# Patient Record
Sex: FEMALE | Race: WHITE | Employment: STUDENT | URBAN - METROPOLITAN AREA
[De-identification: names, ages, dates, MRNs, and addresses within clinical notes are randomized per-mention and may not be internally consistent; named-entity substitution may affect disease eponyms.]

---

## 2017-02-24 ENCOUNTER — TELEPHONE (OUTPATIENT)
Dept: FAMILY MEDICINE CLINIC | Facility: CLINIC | Age: 22
End: 2017-02-24

## 2017-02-24 NOTE — TELEPHONE ENCOUNTER
Reviewed pt's immunization hx with mom. If our records are up-to-date then pt due for Meningitis, Tdap, Hep A, Varicella #2, Gardasil. Mom will look to see if she can find some more recent records.  Pt has 5/1/2017 physical scheduled and will get any needed

## 2017-04-14 ENCOUNTER — CHARTING TRANS (OUTPATIENT)
Dept: OTHER | Age: 22
End: 2017-04-14

## 2017-05-01 ENCOUNTER — OFFICE VISIT (OUTPATIENT)
Dept: FAMILY MEDICINE CLINIC | Facility: CLINIC | Age: 22
End: 2017-05-01

## 2017-05-01 VITALS
WEIGHT: 218 LBS | DIASTOLIC BLOOD PRESSURE: 60 MMHG | BODY MASS INDEX: 33.82 KG/M2 | SYSTOLIC BLOOD PRESSURE: 100 MMHG | HEART RATE: 64 BPM | HEIGHT: 67.5 IN | RESPIRATION RATE: 12 BRPM

## 2017-05-01 DIAGNOSIS — J45.20 MILD INTERMITTENT ASTHMA WITHOUT COMPLICATION: ICD-10-CM

## 2017-05-01 DIAGNOSIS — Z71.3 ENCOUNTER FOR DIETARY COUNSELING AND SURVEILLANCE: ICD-10-CM

## 2017-05-01 DIAGNOSIS — Z00.00 ANNUAL PHYSICAL EXAM: Primary | ICD-10-CM

## 2017-05-01 DIAGNOSIS — Z71.82 EXERCISE COUNSELING: ICD-10-CM

## 2017-05-01 DIAGNOSIS — Z23 NEED FOR VACCINATION: ICD-10-CM

## 2017-05-01 PROCEDURE — 90632 HEPA VACCINE ADULT IM: CPT | Performed by: FAMILY MEDICINE

## 2017-05-01 PROCEDURE — 90651 9VHPV VACCINE 2/3 DOSE IM: CPT | Performed by: FAMILY MEDICINE

## 2017-05-01 PROCEDURE — 90472 IMMUNIZATION ADMIN EACH ADD: CPT | Performed by: FAMILY MEDICINE

## 2017-05-01 PROCEDURE — 90734 MENACWYD/MENACWYCRM VACC IM: CPT | Performed by: FAMILY MEDICINE

## 2017-05-01 PROCEDURE — 90471 IMMUNIZATION ADMIN: CPT | Performed by: FAMILY MEDICINE

## 2017-05-01 PROCEDURE — 99395 PREV VISIT EST AGE 18-39: CPT | Performed by: FAMILY MEDICINE

## 2017-05-01 RX ORDER — LEVALBUTEROL TARTRATE 45 UG/1
2 AEROSOL, METERED ORAL EVERY 4 HOURS PRN
Qty: 3 INHALER | Refills: 3 | Status: SHIPPED | OUTPATIENT
Start: 2017-05-01

## 2017-05-01 NOTE — PROGRESS NOTES
Jaylen Brewster is a 22year old female who presents for a complete physical exam.   HPI:   Patient presents with:  Physical  Asthma       Her last annual assessment has been over 1 year: Annual Physical due on 11/09/1998        Symptoms: denies discharge, i of Breath.  Disp: 3 Inhaler Rfl: 3      Past Medical History   Diagnosis Date   • Extrinsic asthma, unspecified      inhalers only when sick   • Migraines           Past Surgical History    TONSILLECTOMY      T&A  7/26/2013    Comment Procedure: Stewart Lau plan.    Assessment:  Problem List Items Addressed This Visit     None         No Follow-up on file. Anne Sosa MD, 5/1/2017, 3:57 PM    Ayla Lawrence is a 22year old female who was brought in for her  Physical and Asthma visit.     History was provid Inhale 2 puffs into the lungs every 4 (four) hours as needed for Wheezing or Shortness of Breath.  Disp: 3 Inhaler Rfl: 3       Allergies    Sulfa Antibiotics           Review of Systems:   Diet:  varied diet and drinks milk and water    Elimination:  no co appropriately for age    Assessment and Plan:     Problem List Items Addressed This Visit        Respiratory    Mild intermittent asthma    Overview     Rare xopenex         Current Assessment & Plan     I reviewed Asthma Control Test Score and reviewed th

## 2017-11-06 ENCOUNTER — HOSPITAL ENCOUNTER (EMERGENCY)
Facility: HOSPITAL | Age: 22
Discharge: HOME | End: 2017-11-06
Attending: EMERGENCY MEDICINE
Payer: COMMERCIAL

## 2017-11-06 VITALS
SYSTOLIC BLOOD PRESSURE: 118 MMHG | OXYGEN SATURATION: 99 % | DIASTOLIC BLOOD PRESSURE: 68 MMHG | HEART RATE: 63 BPM | TEMPERATURE: 98 F | RESPIRATION RATE: 16 BRPM | WEIGHT: 215 LBS | BODY MASS INDEX: 32.58 KG/M2 | HEIGHT: 68 IN

## 2017-11-06 DIAGNOSIS — R11.2 NON-INTRACTABLE VOMITING WITH NAUSEA, UNSPECIFIED VOMITING TYPE: Primary | ICD-10-CM

## 2017-11-06 DIAGNOSIS — R55 SYNCOPE, VASOVAGAL: ICD-10-CM

## 2017-11-06 PROCEDURE — 96361 HYDRATE IV INFUSION ADD-ON: CPT

## 2017-11-06 PROCEDURE — 81003 URINALYSIS AUTO W/O SCOPE: CPT | Performed by: EMERGENCY MEDICINE

## 2017-11-06 PROCEDURE — 80048 BASIC METABOLIC PNL TOTAL CA: CPT | Performed by: EMERGENCY MEDICINE

## 2017-11-06 PROCEDURE — 96374 THER/PROPH/DIAG INJ IV PUSH: CPT

## 2017-11-06 PROCEDURE — 99284 EMERGENCY DEPT VISIT MOD MDM: CPT

## 2017-11-06 PROCEDURE — 85025 COMPLETE CBC W/AUTO DIFF WBC: CPT

## 2017-11-06 PROCEDURE — 80048 BASIC METABOLIC PNL TOTAL CA: CPT

## 2017-11-06 PROCEDURE — 81003 URINALYSIS AUTO W/O SCOPE: CPT

## 2017-11-06 PROCEDURE — 81025 URINE PREGNANCY TEST: CPT

## 2017-11-06 PROCEDURE — 85025 COMPLETE CBC W/AUTO DIFF WBC: CPT | Performed by: EMERGENCY MEDICINE

## 2017-11-06 PROCEDURE — 84443 ASSAY THYROID STIM HORMONE: CPT | Performed by: EMERGENCY MEDICINE

## 2017-11-06 PROCEDURE — 93010 ELECTROCARDIOGRAM REPORT: CPT | Performed by: EMERGENCY MEDICINE

## 2017-11-06 PROCEDURE — 93005 ELECTROCARDIOGRAM TRACING: CPT

## 2017-11-06 PROCEDURE — 80076 HEPATIC FUNCTION PANEL: CPT | Performed by: NURSE PRACTITIONER

## 2017-11-06 PROCEDURE — 81025 URINE PREGNANCY TEST: CPT | Performed by: EMERGENCY MEDICINE

## 2017-11-06 PROCEDURE — 83690 ASSAY OF LIPASE: CPT | Performed by: NURSE PRACTITIONER

## 2017-11-06 RX ORDER — ONDANSETRON 4 MG/1
4 TABLET, ORALLY DISINTEGRATING ORAL EVERY 6 HOURS PRN
Qty: 5 TABLET | Refills: 0 | Status: SHIPPED | OUTPATIENT
Start: 2017-11-06 | End: 2017-11-13

## 2017-11-06 RX ORDER — ONDANSETRON 2 MG/ML
4 INJECTION INTRAMUSCULAR; INTRAVENOUS ONCE
Status: COMPLETED | OUTPATIENT
Start: 2017-11-06 | End: 2017-11-06

## 2017-11-07 NOTE — ED PROVIDER NOTES
Patient Seen in: [facility] AND [facility] Emergency Department    History   Patient presents with:  Nausea/vomiting    Stated Complaint: vomiting    Patient presents into the emergency room for evaluation of nausea and vomiting.   Patient states the onset of the Smokeless tobacco: Never Used                      Alcohol use: No                Review of Systems   Gastrointestinal: Positive for nausea and vomiting. Negative for abdominal pain, constipation and diarrhea.    Neurological: Posit Skin is warm and dry. She is not diaphoretic. Nursing note and vitals reviewed.         ED Course     Labs Reviewed   CBC W/ DIFFERENTIAL - Abnormal; Notable for the following:        Result Value    MCH 32.7 (*)     All other components within normal guallpa Negative   Nitrite Urine Negative Negative   Urobilinogen Urine <2.0 <2.0   Leukocyte Esterase Urine Negative Negative   Ascorbic Acid Urine Negative Negative mg/dL   Microscopic Microscopic not indicated    -PREGNANCY TEST, URINE   Collection Time: 11/06/

## 2017-11-07 NOTE — ED INITIAL ASSESSMENT (HPI)
Having nausea since 4pm, and on and off for 2 weeks. Passed ouit 2 weeks ago and then today. having vomiting no diarrhea.  Having mid abd pain

## 2017-11-14 ENCOUNTER — OFFICE VISIT (OUTPATIENT)
Dept: FAMILY MEDICINE CLINIC | Facility: CLINIC | Age: 22
End: 2017-11-14

## 2017-11-14 ENCOUNTER — APPOINTMENT (OUTPATIENT)
Dept: LAB | Age: 22
End: 2017-11-14
Attending: FAMILY MEDICINE
Payer: COMMERCIAL

## 2017-11-14 VITALS
WEIGHT: 220.19 LBS | HEART RATE: 72 BPM | RESPIRATION RATE: 12 BRPM | DIASTOLIC BLOOD PRESSURE: 74 MMHG | SYSTOLIC BLOOD PRESSURE: 100 MMHG | BODY MASS INDEX: 33.37 KG/M2 | HEIGHT: 68 IN | TEMPERATURE: 98 F

## 2017-11-14 DIAGNOSIS — R53.82 CHRONIC FATIGUE: ICD-10-CM

## 2017-11-14 DIAGNOSIS — K29.00 ACUTE GASTRITIS WITHOUT HEMORRHAGE, UNSPECIFIED GASTRITIS TYPE: Primary | ICD-10-CM

## 2017-11-14 DIAGNOSIS — R55 SYNCOPE, UNSPECIFIED SYNCOPE TYPE: ICD-10-CM

## 2017-11-14 PROCEDURE — 99214 OFFICE O/P EST MOD 30 MIN: CPT | Performed by: FAMILY MEDICINE

## 2017-11-14 RX ORDER — RANITIDINE 150 MG/1
150 TABLET ORAL 2 TIMES DAILY
Qty: 60 TABLET | Refills: 1 | Status: SHIPPED | OUTPATIENT
Start: 2017-11-14

## 2017-11-14 NOTE — PROGRESS NOTES
Chief Complaint:  Patient presents with:  Lab Results    HPI:  This is a 25year old female patient presenting for Lab Results    Patient presents today with her mother. She is currently a college student at [employer].     Notes she has been fatigued Constitutional: Positive for fatigue. Negative for appetite change, fever and unexpected weight change. HENT: Negative. Respiratory: Negative for cough, shortness of breath and wheezing. Cardiovascular: Negative for chest pain and palpitations. tablet (150 mg total) by mouth 2 (two) times daily. Disp: 60 tablet Rfl: 1   Levalbuterol Tartrate (XOPENEX HFA) 45 MCG/ACT Inhalation Aerosol Inhale 2 puffs into the lungs every 4 (four) hours as needed for Wheezing or Shortness of Breath.  Disp: 3 Inhaler ranitidine. Discussed low acid diet.  -     RaNITidine HCl 150 MG Oral Tab; Take 1 tablet (150 mg total) by mouth 2 (two) times daily. Chronic fatigue  Unclear etiology. Pt denies recent stressors. Will r/o underlying iron, vit deficiency.   -     Krystal Price

## 2017-12-09 ENCOUNTER — HOSPITAL ENCOUNTER (OUTPATIENT)
Dept: MRI IMAGING | Age: 22
Discharge: HOME | End: 2017-12-09
Attending: ORTHOPAEDIC SURGERY
Payer: COMMERCIAL

## 2017-12-09 ENCOUNTER — HOSPITAL ENCOUNTER (OUTPATIENT)
Dept: GENERAL RADIOLOGY | Age: 22
Discharge: HOME | End: 2017-12-09
Attending: RADIOLOGY
Payer: COMMERCIAL

## 2017-12-09 DIAGNOSIS — R52 PAIN: ICD-10-CM

## 2017-12-09 DIAGNOSIS — S93.601A SPRAIN OF RIGHT FOOT: ICD-10-CM

## 2017-12-09 PROCEDURE — 73718 MRI LOWER EXTREMITY W/O DYE: CPT | Performed by: ORTHOPAEDIC SURGERY

## 2017-12-09 PROCEDURE — 73620 X-RAY EXAM OF FOOT: CPT | Performed by: RADIOLOGY

## 2018-05-23 ENCOUNTER — CHARTING TRANS (OUTPATIENT)
Dept: OTHER | Age: 23
End: 2018-05-23

## 2018-11-01 VITALS — HEIGHT: 68 IN | HEART RATE: 76 BPM | WEIGHT: 239.2 LBS | RESPIRATION RATE: 16 BRPM | BODY MASS INDEX: 36.25 KG/M2

## 2018-11-03 VITALS
DIASTOLIC BLOOD PRESSURE: 74 MMHG | HEIGHT: 68 IN | BODY MASS INDEX: 32.13 KG/M2 | TEMPERATURE: 98.7 F | OXYGEN SATURATION: 99 % | SYSTOLIC BLOOD PRESSURE: 106 MMHG | HEART RATE: 70 BPM | RESPIRATION RATE: 16 BRPM | WEIGHT: 211.97 LBS

## 2022-02-18 ENCOUNTER — OFFICE VISIT (OUTPATIENT)
Dept: FAMILY MEDICINE CLINIC | Facility: CLINIC | Age: 27
End: 2022-02-18
Payer: COMMERCIAL

## 2022-02-18 VITALS
BODY MASS INDEX: 38.65 KG/M2 | HEART RATE: 82 BPM | DIASTOLIC BLOOD PRESSURE: 86 MMHG | SYSTOLIC BLOOD PRESSURE: 132 MMHG | HEIGHT: 68 IN | RESPIRATION RATE: 18 BRPM | WEIGHT: 255 LBS

## 2022-02-18 DIAGNOSIS — N91.2 ANOVULATORY AMENORRHEA: ICD-10-CM

## 2022-02-18 DIAGNOSIS — Z00.00 ANNUAL PHYSICAL EXAM: Primary | ICD-10-CM

## 2022-02-18 DIAGNOSIS — G43.709 CHRONIC MIGRAINE WITHOUT AURA WITHOUT STATUS MIGRAINOSUS, NOT INTRACTABLE: ICD-10-CM

## 2022-02-18 DIAGNOSIS — R73.9 HYPERGLYCEMIA: ICD-10-CM

## 2022-02-18 DIAGNOSIS — J45.20 MILD INTERMITTENT ASTHMA WITHOUT COMPLICATION: ICD-10-CM

## 2022-02-18 DIAGNOSIS — E66.01 SEVERE OBESITY (BMI 35.0-39.9) WITH COMORBIDITY (HCC): ICD-10-CM

## 2022-02-18 DIAGNOSIS — Z00.00 LABORATORY EXAMINATION ORDERED AS PART OF A ROUTINE GENERAL MEDICAL EXAMINATION: ICD-10-CM

## 2022-02-18 PROCEDURE — 3008F BODY MASS INDEX DOCD: CPT | Performed by: FAMILY MEDICINE

## 2022-02-18 PROCEDURE — 99385 PREV VISIT NEW AGE 18-39: CPT | Performed by: FAMILY MEDICINE

## 2022-02-18 PROCEDURE — 3079F DIAST BP 80-89 MM HG: CPT | Performed by: FAMILY MEDICINE

## 2022-02-18 PROCEDURE — 3075F SYST BP GE 130 - 139MM HG: CPT | Performed by: FAMILY MEDICINE

## 2022-02-18 RX ORDER — TRAZODONE HYDROCHLORIDE 50 MG/1
TABLET ORAL NIGHTLY PRN
Qty: 90 TABLET | Refills: 0 | Status: SHIPPED | OUTPATIENT
Start: 2022-02-18 | End: 2023-02-13

## 2022-03-30 LAB
ALBUMIN/GLOBULIN RATIO: 1.8 (CALC) (ref 1–2.5)
ALBUMIN: 4.6 G/DL (ref 3.6–5.1)
ALKALINE PHOSPHATASE: 67 U/L (ref 31–125)
ALT: 15 U/L (ref 6–29)
AST: 12 U/L (ref 10–30)
BILIRUBIN, TOTAL: 0.4 MG/DL (ref 0.2–1.2)
BUN: 12 MG/DL (ref 7–25)
CALCIUM: 9.2 MG/DL (ref 8.6–10.2)
CARBON DIOXIDE: 25 MMOL/L (ref 20–32)
CHLORIDE: 105 MMOL/L (ref 98–110)
CHOL/HDLC RATIO: 3.1 (CALC)
CHOLESTEROL, TOTAL: 179 MG/DL
CREATININE: 0.67 MG/DL (ref 0.5–1.1)
EGFR IF AFRICN AM: 142 ML/MIN/1.73M2
EGFR IF NONAFRICN AM: 122 ML/MIN/1.73M2
ESTRADIOL: 58 PG/ML
FSH: 5.7 MIU/ML
GLOBULIN: 2.6 G/DL (CALC) (ref 1.9–3.7)
GLUCOSE: 82 MG/DL (ref 65–99)
HDL CHOLESTEROL: 58 MG/DL
HEMATOCRIT: 40.9 % (ref 35–45)
HEMOGLOBIN A1C: 5.2 % OF TOTAL HGB
HEMOGLOBIN: 14.5 G/DL (ref 11.7–15.5)
LDL-CHOLESTEROL: 106 MG/DL (CALC)
LH: 11.7 MIU/ML
MCH: 32.1 PG (ref 27–33)
MCHC: 35.5 G/DL (ref 32–36)
MCV: 90.5 FL (ref 80–100)
MPV: 10.2 FL (ref 7.5–12.5)
NON-HDL CHOLESTEROL: 121 MG/DL (CALC)
PLATELET COUNT: 385 THOUSAND/UL (ref 140–400)
POTASSIUM: 4.5 MMOL/L (ref 3.5–5.3)
PROTEIN, TOTAL: 7.2 G/DL (ref 6.1–8.1)
RDW: 12.2 % (ref 11–15)
RED BLOOD CELL COUNT: 4.52 MILLION/UL (ref 3.8–5.1)
SODIUM: 138 MMOL/L (ref 135–146)
T4, FREE: 1.1 NG/DL (ref 0.8–1.8)
TRIGLYCERIDES: 60 MG/DL
TSH: 2.83 MIU/L
WHITE BLOOD CELL COUNT: 5.4 THOUSAND/UL (ref 3.8–10.8)

## 2022-04-19 RX ORDER — TRAZODONE HYDROCHLORIDE 50 MG/1
TABLET ORAL NIGHTLY PRN
Qty: 90 TABLET | Refills: 3 | Status: SHIPPED | OUTPATIENT
Start: 2022-04-19 | End: 2023-04-14

## 2022-04-19 RX ORDER — TRAZODONE HYDROCHLORIDE 50 MG/1
TABLET ORAL NIGHTLY PRN
Qty: 90 TABLET | Refills: 0 | OUTPATIENT
Start: 2022-04-19 | End: 2023-04-14

## 2022-07-21 PROBLEM — E66.01 SEVERE OBESITY (BMI 35.0-39.9) WITH COMORBIDITY (HCC): Status: ACTIVE | Noted: 2022-07-21

## 2022-07-21 PROBLEM — N91.2 ANOVULATORY AMENORRHEA: Status: ACTIVE | Noted: 2022-07-21

## 2022-07-22 ENCOUNTER — OFFICE VISIT (OUTPATIENT)
Dept: FAMILY MEDICINE CLINIC | Facility: CLINIC | Age: 27
End: 2022-07-22
Payer: COMMERCIAL

## 2022-07-22 VITALS
BODY MASS INDEX: 40.19 KG/M2 | WEIGHT: 265.19 LBS | RESPIRATION RATE: 18 BRPM | SYSTOLIC BLOOD PRESSURE: 130 MMHG | HEIGHT: 68 IN | HEART RATE: 80 BPM | DIASTOLIC BLOOD PRESSURE: 70 MMHG

## 2022-07-22 DIAGNOSIS — E66.01 SEVERE OBESITY (BMI 35.0-39.9) WITH COMORBIDITY (HCC): Primary | ICD-10-CM

## 2022-07-22 DIAGNOSIS — G43.709 CHRONIC MIGRAINE WITHOUT AURA WITHOUT STATUS MIGRAINOSUS, NOT INTRACTABLE: ICD-10-CM

## 2022-07-22 DIAGNOSIS — F41.9 ANXIETY: ICD-10-CM

## 2022-07-22 DIAGNOSIS — N91.2 ANOVULATORY AMENORRHEA: ICD-10-CM

## 2022-07-22 PROCEDURE — 3078F DIAST BP <80 MM HG: CPT | Performed by: FAMILY MEDICINE

## 2022-07-22 PROCEDURE — 3075F SYST BP GE 130 - 139MM HG: CPT | Performed by: FAMILY MEDICINE

## 2022-07-22 PROCEDURE — 3008F BODY MASS INDEX DOCD: CPT | Performed by: FAMILY MEDICINE

## 2022-07-22 PROCEDURE — 99214 OFFICE O/P EST MOD 30 MIN: CPT | Performed by: FAMILY MEDICINE

## 2022-07-22 RX ORDER — TRAZODONE HYDROCHLORIDE 100 MG/1
100 TABLET ORAL NIGHTLY PRN
Qty: 90 TABLET | Refills: 3 | Status: SHIPPED | OUTPATIENT
Start: 2022-07-22 | End: 2023-07-17

## 2022-08-17 NOTE — TELEPHONE ENCOUNTER
Received refill request from [facility] for Trazodone and Sertraline.   LOV 7/22/22  Pt has refill left but at [address] [place] [address] and Pt now will be using [facility]  Can you resent to [facility]

## 2022-12-01 ENCOUNTER — LAB ENCOUNTER (OUTPATIENT)
Dept: LAB | Facility: HOSPITAL | Age: 27
End: 2022-12-01
Attending: OBSTETRICS & GYNECOLOGY
Payer: COMMERCIAL

## 2022-12-01 ENCOUNTER — OFFICE VISIT (OUTPATIENT)
Dept: OBGYN CLINIC | Facility: CLINIC | Age: 27
End: 2022-12-01
Payer: COMMERCIAL

## 2022-12-01 VITALS
DIASTOLIC BLOOD PRESSURE: 72 MMHG | BODY MASS INDEX: 38.65 KG/M2 | SYSTOLIC BLOOD PRESSURE: 124 MMHG | WEIGHT: 255 LBS | HEIGHT: 68 IN

## 2022-12-01 DIAGNOSIS — Z11.3 SCREEN FOR STD (SEXUALLY TRANSMITTED DISEASE): ICD-10-CM

## 2022-12-01 DIAGNOSIS — N91.5 OLIGOMENORRHEA, UNSPECIFIED TYPE: ICD-10-CM

## 2022-12-01 DIAGNOSIS — Z01.419 ENCOUNTER FOR ANNUAL ROUTINE GYNECOLOGICAL EXAMINATION: Primary | ICD-10-CM

## 2022-12-01 LAB — DHEA-S SERPL-MCNC: 221.5 UG/DL

## 2022-12-01 PROCEDURE — 87591 N.GONORRHOEAE DNA AMP PROB: CPT | Performed by: OBSTETRICS & GYNECOLOGY

## 2022-12-01 PROCEDURE — 3078F DIAST BP <80 MM HG: CPT | Performed by: OBSTETRICS & GYNECOLOGY

## 2022-12-01 PROCEDURE — 3008F BODY MASS INDEX DOCD: CPT | Performed by: OBSTETRICS & GYNECOLOGY

## 2022-12-01 PROCEDURE — 3074F SYST BP LT 130 MM HG: CPT | Performed by: OBSTETRICS & GYNECOLOGY

## 2022-12-01 PROCEDURE — 99385 PREV VISIT NEW AGE 18-39: CPT | Performed by: OBSTETRICS & GYNECOLOGY

## 2022-12-01 PROCEDURE — 87491 CHLMYD TRACH DNA AMP PROBE: CPT | Performed by: OBSTETRICS & GYNECOLOGY

## 2022-12-01 RX ORDER — ETONOGESTREL AND ETHINYL ESTRADIOL 11.7; 2.7 MG/1; MG/1
INSERT, EXTENDED RELEASE VAGINAL
Qty: 3 RING | Refills: 1 | Status: SHIPPED | OUTPATIENT
Start: 2022-12-01

## 2022-12-01 RX ORDER — ETONOGESTREL/ETHINYL ESTRADIOL .12-.015MG
1 RING, VAGINAL VAGINAL
Qty: 3 EACH | Refills: 1 | Status: SHIPPED | OUTPATIENT
Start: 2022-12-01 | End: 2022-12-01

## 2022-12-01 RX ORDER — MEDROXYPROGESTERONE ACETATE 10 MG/1
10 TABLET ORAL DAILY
Qty: 30 TABLET | Refills: 11 | Status: SHIPPED | OUTPATIENT
Start: 2022-12-01

## 2022-12-02 LAB
C TRACH DNA SPEC QL NAA+PROBE: NEGATIVE
N GONORRHOEA DNA SPEC QL NAA+PROBE: NEGATIVE
T PALLIDUM AB SER QL: NEGATIVE

## 2022-12-07 LAB
TESTOSTERONE, FREE, S: 2.77 NG/DL
TESTOSTERONE, TOTAL, S: 143 NG/DL

## 2022-12-12 DIAGNOSIS — F41.9 ANXIETY: ICD-10-CM

## 2023-03-25 ENCOUNTER — APPOINTMENT (OUTPATIENT)
Dept: GENERAL RADIOLOGY | Age: 28
End: 2023-03-25
Attending: NURSE PRACTITIONER
Payer: COMMERCIAL

## 2023-03-25 ENCOUNTER — HOSPITAL ENCOUNTER (OUTPATIENT)
Age: 28
Discharge: HOME | End: 2023-03-25
Payer: COMMERCIAL

## 2023-03-25 VITALS
SYSTOLIC BLOOD PRESSURE: 148 MMHG | OXYGEN SATURATION: 99 % | DIASTOLIC BLOOD PRESSURE: 88 MMHG | HEART RATE: 99 BPM | RESPIRATION RATE: 20 BRPM | TEMPERATURE: 98 F

## 2023-03-25 DIAGNOSIS — M25.532 LEFT WRIST PAIN: Primary | ICD-10-CM

## 2023-03-25 PROCEDURE — L3908 WHO COCK-UP NONMOLDE PRE OTS: HCPCS | Performed by: NURSE PRACTITIONER

## 2023-03-25 PROCEDURE — 99203 OFFICE O/P NEW LOW 30 MIN: CPT | Performed by: NURSE PRACTITIONER

## 2023-03-25 PROCEDURE — 73110 X-RAY EXAM OF WRIST: CPT | Performed by: NURSE PRACTITIONER

## 2023-03-25 NOTE — DISCHARGE INSTRUCTIONS
No fracture seen on the x-ray. Wear the wrist splint for comfort. Ibuprofen every 6 hours as needed for pain. Ice the area.   Follow-up with your primary doctor or orthopedics if no improvement

## 2023-03-25 NOTE — ED INITIAL ASSESSMENT (HPI)
Pt c/o left wrist pain, which started approx 1 week ago. Pain is worse when she turns her wrist, to have palm facing up. Rates pain 8/10. Pt boxes 3 times per week, but denies known injury.  Denies previous injury

## 2023-05-19 DIAGNOSIS — F41.9 ANXIETY: ICD-10-CM

## 2023-05-24 RX ORDER — TRAZODONE HYDROCHLORIDE 100 MG/1
100 TABLET ORAL NIGHTLY PRN
Qty: 90 TABLET | Refills: 1 | Status: SHIPPED | OUTPATIENT
Start: 2023-05-24 | End: 2024-05-18

## 2023-05-24 NOTE — TELEPHONE ENCOUNTER
Sertraline refilled 12/14/22 for #90/3 to [facility].   Remaining refills to be sent [facility]. Pending for provider review. Has drug interaction with trazodone. No protocol for trazodone. Last refilled 8/17/22 #90/1. LOV 7/22/22. No upcoming scheduled.

## 2023-11-13 DIAGNOSIS — F41.9 ANXIETY: ICD-10-CM

## 2023-11-13 RX ORDER — TRAZODONE HYDROCHLORIDE 100 MG/1
100 TABLET ORAL NIGHTLY PRN
Qty: 90 TABLET | Refills: 0 | Status: SHIPPED | OUTPATIENT
Start: 2023-11-13

## 2023-11-13 NOTE — TELEPHONE ENCOUNTER
Refill request for:    Requested Prescriptions     Pending Prescriptions Disp Refills    SERTRALINE 50 MG Oral Tab [Pharmacy Med Name: SERTRALINE HCL TABS 50MG] 90 tablet 3     Sig: TAKE 1 TABLET DAILY    TRAZODONE 100 MG Oral Tab [Pharmacy Med Name: TRAZODONE HCL TABS 100MG] 90 tablet 3     Sig: TAKE 1 TABLET NIGHTLY AS NEEDED FOR SLEEP        LOV 7/22/22    Patient was asked to follow-up in:       Appointment scheduled: Visit date not found    Medication not on protocol.      # 90 with 3 refills routed to Piero Godoy MD for review

## 2023-12-18 RX ORDER — ETONOGESTREL AND ETHINYL ESTRADIOL VAGINAL RING .015; .12 MG/D; MG/D
RING VAGINAL
Qty: 1 RING | Refills: 0 | Status: SHIPPED | OUTPATIENT
Start: 2023-12-18

## 2024-02-12 DIAGNOSIS — F41.9 ANXIETY: ICD-10-CM

## 2024-02-15 NOTE — TELEPHONE ENCOUNTER
Refill request for:    Requested Prescriptions     Pending Prescriptions Disp Refills    TRAZODONE 100 MG Oral Tab [Pharmacy Med Name: TRAZODONE HCL TABS 100MG] 90 tablet 3     Sig: TAKE 1 TABLET NIGHTLY AS NEEDED FOR SLEEP    SERTRALINE 50 MG Oral Tab [Pharmacy Med Name: SERTRALINE HCL TABS 50MG] 90 tablet 3     Sig: TAKE 1 TABLET DAILY        Last Prescribed Quantity Refills   Trazodone 90 0   Sertraline 90 0     LOV 7/22/22    Patient was asked to follow-up in: 3 months    Appointment scheduled: Visit date not found    Medication not on protocol.     Pt asked to return in 3 months (around 10/22/22) for recheck and 1 year for Physical.    Please assist with scheduling.  Thank you.     Routed to front office.

## 2024-03-05 ENCOUNTER — TELEPHONE (OUTPATIENT)
Dept: FAMILY MEDICINE CLINIC | Facility: CLINIC | Age: 29
End: 2024-03-05

## 2024-03-05 DIAGNOSIS — Z13.6 SCREENING FOR CARDIOVASCULAR CONDITION: ICD-10-CM

## 2024-03-05 DIAGNOSIS — Z13.1 SCREENING FOR DIABETES MELLITUS: Primary | ICD-10-CM

## 2024-03-05 DIAGNOSIS — Z13.0 SCREENING FOR IRON DEFICIENCY ANEMIA: ICD-10-CM

## 2024-03-05 DIAGNOSIS — Z11.59 ENCOUNTER FOR HEPATITIS C SCREENING TEST FOR LOW RISK PATIENT: ICD-10-CM

## 2024-03-05 DIAGNOSIS — F41.9 ANXIETY: ICD-10-CM

## 2024-03-05 DIAGNOSIS — Z00.00 LABORATORY EXAMINATION ORDERED AS PART OF A ROUTINE GENERAL MEDICAL EXAMINATION: ICD-10-CM

## 2024-03-05 DIAGNOSIS — Z13.29 SCREENING FOR THYROID DISORDER: ICD-10-CM

## 2024-03-05 NOTE — TELEPHONE ENCOUNTER
Please enter lab orders for the patient's upcoming physical appointment.     Physical scheduled:   Your appointments       Date & Time Appointment Department ([place])    Apr 17, 2024  1:30 PM CDT Adult Physical with Robin Reece MD [facility] ([facility])    PLEASE NOTE - Most insurances allow a Complete Physical once every 366 days. Please schedule accordingly.    Please arrive 15 minutes prior to your scheduled appointment. Please also bring your Insurance card, Photo ID, and your medication bottles or a list of your current medication.    If you no longer require this appointment, please contact your physician office to cancel.              [facility] [address]  [address] [place] [id number] [phone]           Preferred lab: [facility]     The patient has been notified to complete fasting labs prior to their physical appointment.

## 2024-03-05 NOTE — TELEPHONE ENCOUNTER
Patient call for medication refill    Patient schedule physical 4/17/24  1;30 pm Shanell.    sertraline 50 MG Oral Tab     traZODone 100 MG Oral Tab     [facility]     [address], [place] [address] [phone]

## 2024-03-05 NOTE — TELEPHONE ENCOUNTER
1. Screening for diabetes mellitus (Primary)  -     Comp Metabolic Panel (14)  2. Screening for iron deficiency anemia  -     CBC With Differential With Platelet  3. Screening for thyroid disorder  -     TSH W Reflex To Free T4  4. Screening for cardiovascular condition  -     Lipid Panel  5. Encounter for hepatitis C screening test for low risk patient  -     HCV Antibody  6. Laboratory examination ordered as part of a routine general medical examination  -     TSH W Reflex To Free T4  -     Lipid Panel  -     CBC With Differential With Platelet  -     Comp Metabolic Panel (14)       OK to notify. Thanks, Brigido Reece MD

## 2024-03-09 DIAGNOSIS — F41.9 ANXIETY: ICD-10-CM

## 2024-03-13 RX ORDER — TRAZODONE HYDROCHLORIDE 100 MG/1
100 TABLET ORAL NIGHTLY PRN
Qty: 90 TABLET | Refills: 0 | OUTPATIENT
Start: 2024-03-13

## 2024-03-13 RX ORDER — TRAZODONE HYDROCHLORIDE 100 MG/1
100 TABLET ORAL NIGHTLY PRN
Qty: 90 TABLET | Refills: 1 | Status: SHIPPED | OUTPATIENT
Start: 2024-03-13

## 2024-03-13 NOTE — TELEPHONE ENCOUNTER
sertraline 50 MG Oral Tab         The original prescription was reordered on 3/13/2024 by Robin Reece MD. Renewing this prescription may not be appropriate.     Possible duplicate: Hover to review recent actions on this medication    Sig: Take 1 tablet (50 mg total) by mouth daily.    Disp: 90 tablet    Refills: 0    Start: 3/9/2024    Class: Normal    Non-formulary For: Anxiety    Last ordered: 4 months ago (11/13/2023) by Robin Reece MD    Psychiatric Non-Scheduled (Anti-Anxiety) [id number]/09/2024 10:25 AM   Protocol Details Depression Screening completed within the past 12 months    In person appointment or virtual visit in the past 6 mos or appointment in next 3 mos       traZODone 100 MG Oral Tab         The original prescription was reordered on 3/13/2024 by Robin Reece MD. Renewing this prescription may not be appropriate.     Possible duplicate: Hover to review recent actions on this medication    Sig: Take 1 tablet (100 mg total) by mouth nightly as needed for Sleep.    Disp: 90 tablet    Refills: 0    Start: 3/9/2024    Class: Normal    Non-formulary For: Anxiety    Last ordered: 4 months ago (11/13/2023) by Robin Reece MD       To be filled at: None [Patient requested: [facility], [address], [place] [address] [phone]]

## 2024-03-20 RX ORDER — TRAZODONE HYDROCHLORIDE 100 MG/1
100 TABLET ORAL NIGHTLY PRN
Qty: 90 TABLET | Refills: 0 | OUTPATIENT
Start: 2024-03-20

## 2024-04-15 LAB
ABSOLUTE BASOPHILS: 52 CELLS/UL (ref 0–200)
ABSOLUTE EOSINOPHILS: 98 CELLS/UL (ref 15–500)
ABSOLUTE LYMPHOCYTES: 3042 CELLS/UL (ref 850–3900)
ABSOLUTE MONOCYTES: 403 CELLS/UL (ref 200–950)
ABSOLUTE NEUTROPHILS: 2906 CELLS/UL (ref 1500–7800)
ALBUMIN/GLOBULIN RATIO: 1.6 (CALC) (ref 1–2.5)
ALBUMIN: 4.2 G/DL (ref 3.6–5.1)
ALKALINE PHOSPHATASE: 60 U/L (ref 31–125)
ALT: 13 U/L (ref 6–29)
AST: 13 U/L (ref 10–30)
BASOPHILS: 0.8 %
BILIRUBIN, TOTAL: 0.4 MG/DL (ref 0.2–1.2)
BUN: 14 MG/DL (ref 7–25)
CALCIUM: 9.2 MG/DL (ref 8.6–10.2)
CARBON DIOXIDE: 23 MMOL/L (ref 20–32)
CHLORIDE: 106 MMOL/L (ref 98–110)
CHOL/HDLC RATIO: 2.9 (CALC)
CHOLESTEROL, TOTAL: 228 MG/DL
CREATININE: 0.75 MG/DL (ref 0.5–0.96)
EGFR: 112 ML/MIN/1.73M2
EOSINOPHILS: 1.5 %
GLOBULIN: 2.6 G/DL (CALC) (ref 1.9–3.7)
GLUCOSE: 81 MG/DL (ref 65–99)
HDL CHOLESTEROL: 78 MG/DL
HEMATOCRIT: 39.4 % (ref 35–45)
HEMOGLOBIN: 13.3 G/DL (ref 11.7–15.5)
LDL-CHOLESTEROL: 134 MG/DL (CALC)
LYMPHOCYTES: 46.8 %
MCH: 31.1 PG (ref 27–33)
MCHC: 33.8 G/DL (ref 32–36)
MCV: 92.1 FL (ref 80–100)
MONOCYTES: 6.2 %
MPV: 9.8 FL (ref 7.5–12.5)
NEUTROPHILS: 44.7 %
NON-HDL CHOLESTEROL: 150 MG/DL (CALC)
PLATELET COUNT: 407 THOUSAND/UL (ref 140–400)
POTASSIUM: 4.5 MMOL/L (ref 3.5–5.3)
PROTEIN, TOTAL: 6.8 G/DL (ref 6.1–8.1)
RDW: 12.3 % (ref 11–15)
RED BLOOD CELL COUNT: 4.28 MILLION/UL (ref 3.8–5.1)
SODIUM: 138 MMOL/L (ref 135–146)
TRIGLYCERIDES: 70 MG/DL
TSH W/REFLEX TO FT4: 1.38 MIU/L
WHITE BLOOD CELL COUNT: 6.5 THOUSAND/UL (ref 3.8–10.8)

## 2024-04-16 NOTE — ASSESSMENT & PLAN NOTE
Asthma (mild intermittent) is under Excellent control and Good compliance.  No Asthma medications on file.

## 2024-04-16 NOTE — ASSESSMENT & PLAN NOTE
BMI, height, weight: No data recorded, No data recorded, There is no height or weight on file to calculate BMI.. Counseled weight loss diet and exercise. BMI is affecting their prediabetes or insulin resistance, hypertension, and hyperlipidemia.   Stable, continue present management

## 2024-04-17 ENCOUNTER — OFFICE VISIT (OUTPATIENT)
Dept: FAMILY MEDICINE CLINIC | Facility: CLINIC | Age: 29
End: 2024-04-17
Payer: COMMERCIAL

## 2024-04-17 VITALS
WEIGHT: 274.38 LBS | HEIGHT: 68 IN | DIASTOLIC BLOOD PRESSURE: 84 MMHG | SYSTOLIC BLOOD PRESSURE: 126 MMHG | RESPIRATION RATE: 16 BRPM | BODY MASS INDEX: 41.59 KG/M2 | HEART RATE: 82 BPM

## 2024-04-17 DIAGNOSIS — J45.20 MILD INTERMITTENT ASTHMA WITHOUT COMPLICATION (HCC): ICD-10-CM

## 2024-04-17 DIAGNOSIS — Z00.00 ANNUAL PHYSICAL EXAM: Primary | ICD-10-CM

## 2024-04-17 DIAGNOSIS — E66.01 SEVERE OBESITY (BMI 35.0-39.9) WITH COMORBIDITY (HCC): ICD-10-CM

## 2024-04-17 DIAGNOSIS — G43.709 CHRONIC MIGRAINE WITHOUT AURA WITHOUT STATUS MIGRAINOSUS, NOT INTRACTABLE: ICD-10-CM

## 2024-04-17 PROCEDURE — 3074F SYST BP LT 130 MM HG: CPT | Performed by: FAMILY MEDICINE

## 2024-04-17 PROCEDURE — 99395 PREV VISIT EST AGE 18-39: CPT | Performed by: FAMILY MEDICINE

## 2024-04-17 PROCEDURE — 96127 BRIEF EMOTIONAL/BEHAV ASSMT: CPT | Performed by: FAMILY MEDICINE

## 2024-04-17 PROCEDURE — 3079F DIAST BP 80-89 MM HG: CPT | Performed by: FAMILY MEDICINE

## 2024-04-17 PROCEDURE — 3008F BODY MASS INDEX DOCD: CPT | Performed by: FAMILY MEDICINE

## 2024-04-17 RX ORDER — PHENTERMINE HYDROCHLORIDE 37.5 MG/1
37.5 TABLET ORAL
Qty: 90 TABLET | Refills: 1 | Status: SHIPPED | COMMUNITY
Start: 2024-04-17 | End: 2024-04-17

## 2024-04-17 RX ORDER — PHENTERMINE HYDROCHLORIDE 37.5 MG/1
37.5 TABLET ORAL
Qty: 90 TABLET | Refills: 1 | Status: SHIPPED | OUTPATIENT
Start: 2024-04-17 | End: 2024-10-14

## 2024-04-17 NOTE — PROGRESS NOTES
Ana Antonio is a 28 year old female who presents for a complete physical exam.   HPI:     Chief Complaint   Patient presents with    Physical     WWE, no pap, see's gyne     Weight Problem     Would like to discuss during visit         Her last annual assessment has been over 1 year: Annual Physical due on 02/18/2023        Symptoms: periods are regular. Patient complains of unale to lose weight despite 6 months of close diet, 3x weekly aeorbic and frustrations on this. .     Tobacco:  She has never smoked tobacco.     Wt Readings from Last 4 Encounters:   04/17/24 274 lb 6.4 oz (124.5 kg)   12/01/22 255 lb (115.7 kg)   07/22/22 265 lb 3.2 oz (120.3 kg)   02/18/22 255 lb (115.7 kg)     Body mass index is 41.72 kg/m².     Labs:   Lab Results   Component Value Date/Time    WBC 6.5 04/13/2024 10:43 AM    HGB 13.3 04/13/2024 10:43 AM     (H) 04/13/2024 10:43 AM      Lab Results   Component Value Date/Time    GLU 81 04/13/2024 10:43 AM     04/13/2024 10:43 AM    K 4.5 04/13/2024 10:43 AM     04/13/2024 10:43 AM    CO2 23 04/13/2024 10:43 AM    CREATSERUM 0.75 04/13/2024 10:43 AM    CA 9.2 04/13/2024 10:43 AM    ALB 4.2 04/13/2024 10:43 AM    TP 6.8 04/13/2024 10:43 AM    ALKPHO 60 04/13/2024 10:43 AM    AST 13 04/13/2024 10:43 AM    ALT 13 04/13/2024 10:43 AM    BILT 0.4 04/13/2024 10:43 AM    TSH 2.83 03/29/2022 09:18 AM    T4F 1.1 03/29/2022 09:18 AM        Lab Results   Component Value Date/Time    CHOLEST 228 (H) 04/13/2024 10:43 AM    HDL 78 04/13/2024 10:43 AM    TRIG 70 04/13/2024 10:43 AM     (H) 04/13/2024 10:43 AM    NONHDLC 150 (H) 04/13/2024 10:43 AM       Lab Results   Component Value Date/Time    A1C 5.2 03/29/2022 09:18 AM      Lab Results   Component Value Date    VITD 35.2 11/14/2017         No recommendations at this time    Current Outpatient Medications   Medication Sig Dispense Refill    Phentermine HCl 37.5 MG Oral Tab Take 1 tablet (37.5 mg total) by mouth every morning  before breakfast. 90 tablet 1    sertraline 50 MG Oral Tab Take 1 tablet (50 mg total) by mouth daily. 90 tablet 1    traZODone 100 MG Oral Tab Take 1 tablet (100 mg total) by mouth nightly as needed for Sleep. 90 tablet 1    Etonogestrel-Ethinyl Estradiol 0.12-0.015 MG/24HR Vaginal Ring PLACE 1 RING VAGINALLY AND LEAVE IT FOR 21 DAYS, TAKE OUT AND LEAVE OUT FOR 7 DAYS BEFORE PLACING A NEW RING 1 Ring 0      Past Medical History:    Extrinsic asthma, unspecified    inhalers only when sick    Migraines      Past Surgical History:   Procedure Laterality Date    T&a  2013    Procedure: TONSILLECTOMY ADENOIDECTOMY;  Surgeon: Marlon Emery MD;  Location:  MAIN OR    Tonsillectomy        Family History   Problem Relation Age of Onset    Asthma Mother     Heart Disease Maternal Grandmother     High Blood Pressure Maternal Grandmother     Diabetes Maternal Grandfather         Type I    Heart Disease Maternal Grandfather     High Blood Pressure Maternal Grandfather     Diabetes Other         Maternal Uncle as a child    Other (Early Death[other]) Other         MGM Brother-heart attack-39yrs    Glaucoma Neg     Breast Cancer Neg     Uterine Cancer Neg     Ovarian Cancer Neg       Social History:  Social History     Socioeconomic History    Marital status: Single   Tobacco Use    Smoking status: Never    Smokeless tobacco: Never   Vaping Use    Vaping status: Never Used   Substance and Sexual Activity    Alcohol use: No    Drug use: No    Sexual activity: Yes     Partners: Male     Birth control/protection: Condom   Other Topics Concern    Blood Transfusions No    Caffeine Concern No    Exercise No         OB History    Para Term  AB Living   0 0 0 0 0 0   SAB IAB Ectopic Multiple Live Births   0 0 0 0 0        Hx of Pap: all Paps normal        Last Pap (date):    Hx of STDs: No    REVIEW OF SYSTEMS:   Review of Systems   Constitutional: Negative.  Negative for activity change, appetite change,  chills and fever.   HENT: Negative.     Eyes: Negative.    Respiratory: Negative.  Negative for shortness of breath.    Cardiovascular: Negative.  Negative for chest pain and palpitations.   Gastrointestinal: Negative.  Negative for abdominal pain.   Genitourinary: Negative.  Negative for dysuria.   Musculoskeletal:  Negative for arthralgias.   Skin: Negative.  Negative for rash.   Allergic/Immunologic: Negative.    Neurological: Negative.         EXAM:   /84   Pulse 82   Resp 16   Ht 5' 8\" (1.727 m)   Wt 274 lb 6.4 oz (124.5 kg)   BMI 41.72 kg/m²  Body mass index is 41.72 kg/m².   Physical Exam  Vitals and nursing note reviewed.   Constitutional:       General: She is not in acute distress.     Appearance: Normal appearance. She is obese.   HENT:      Head: Normocephalic and atraumatic.      Right Ear: Tympanic membrane and external ear normal.      Left Ear: Tympanic membrane and external ear normal.      Nose: Nose normal.      Mouth/Throat:      Mouth: Mucous membranes are moist.   Eyes:      Extraocular Movements: Extraocular movements intact.      Pupils: Pupils are equal, round, and reactive to light.   Cardiovascular:      Rate and Rhythm: Normal rate and regular rhythm.      Pulses: Normal pulses.           Carotid pulses are 2+ on the right side and 2+ on the left side.       Radial pulses are 2+ on the right side and 2+ on the left side.        Dorsalis pedis pulses are 2+ on the right side and 2+ on the left side.        Posterior tibial pulses are 2+ on the right side and 2+ on the left side.      Heart sounds: Normal heart sounds, S1 normal and S2 normal. No murmur heard.  Pulmonary:      Effort: Pulmonary effort is normal. No respiratory distress.      Breath sounds: Normal breath sounds. No wheezing.   Abdominal:      General: Abdomen is flat. Bowel sounds are normal. There is no distension.      Palpations: Abdomen is soft.      Tenderness: There is no abdominal tenderness.    Musculoskeletal:         General: Normal range of motion.      Cervical back: Normal range of motion and neck supple.      Right lower leg: No edema.      Left lower leg: No edema.   Skin:     General: Skin is warm and dry.      Capillary Refill: Capillary refill takes less than 2 seconds.      Findings: No rash.   Neurological:      General: No focal deficit present.      Mental Status: She is alert and oriented to person, place, and time.   Psychiatric:         Mood and Affect: Mood normal.         Behavior: Behavior normal.         Thought Content: Thought content normal.         Judgment: Judgment normal.          ASSESSMENT AND PLAN:   Ana Antonio is a 28 year old female who presents for a complete physical exam.   Pt's weight is Body mass index is 41.72 kg/m²., recommended low fat diet and aerobic exercise 30 minutes three times weekly.   Health maintenance,   Up to date   Immunizations:  Up to date   Immunization History   Administered Date(s) Administered    Covid-19 Vaccine Moderna 100 mcg/0.5 ml 02/12/2021, 03/12/2021, 11/27/2021    Covid-19 Vaccine Moderna Bivalent 50mcg/0.5mL 12+ years 10/07/2022    DTAP 01/02/2001    DTAP/HIB 03/04/1997, 05/06/1997, 09/04/1997, 11/16/1998    DTP/HIB Combined 03/04/1997, 05/06/1997, 09/04/1997    FLUZONE 6 months and older PFS 0.5 ml (60566) 12/22/2023    HEP B 11/09/1996, 12/26/1996, 09/04/1997    HEP B, Ped/Adol 11/09/1996, 12/26/1996, 09/04/1997    HPV (Gardasil) 12/26/2015, 01/30/2016    Hep A, Adult 05/01/2017    Hpv Virus Vaccine 9 Estrellita Im 05/01/2017    IPV 01/02/2001    MMR 11/24/1997, 04/12/2002    Meningococcal-Menactra 05/01/2017    Moderna Covid-19 Vaccine 50mcg/0.5ml 12yrs+ (6407-1600) 12/22/2023    OPV 03/04/1997, 05/06/1997, 09/04/1997    Varicella 08/19/1999        Pt info given for: exercise, low fat diet, The patient indicates understanding of these issues and agrees to the plan.    Assessment:  1. Annual physical exam (Primary)  2. Mild intermittent  asthma without complication (HCC)  Overview:  Rare xopenex  Assessment & Plan:  Asthma (mild intermittent) is under Excellent control and Good compliance.  No Asthma medications on file.   3. Chronic migraine without aura without status migrainosus, not intractable  Overview:  Excedrin PRN  Assessment & Plan:  Stable, continue present management     4. Severe obesity (BMI 35.0-39.9) with comorbidity (HCC)  Assessment & Plan:  BMI, height, weight: No data recorded, No data recorded, There is no height or weight on file to calculate BMI.. Counseled weight loss diet and exercise. BMI is affecting their prediabetes or insulin resistance, hypertension, and hyperlipidemia.   Stable, continue present management   Orders:  -     Referral to Weight Loss Clinic  -     Discontinue: Phentermine HCl; Take 1 tablet (37.5 mg total) by mouth every morning before breakfast.  Dispense: 90 tablet; Refill: 1  -     Phentermine HCl; Take 1 tablet (37.5 mg total) by mouth every morning before breakfast.  Dispense: 90 tablet; Refill: 1        Return in about 1 year (around 4/17/2025) for Annual physical.    Robin Reece MD, 4/17/2024, 1:26 PM

## 2024-08-14 DIAGNOSIS — F41.9 ANXIETY: ICD-10-CM

## 2024-08-14 RX ORDER — TRAZODONE HYDROCHLORIDE 100 MG/1
100 TABLET ORAL NIGHTLY PRN
Qty: 90 TABLET | Refills: 1 | Status: SHIPPED | OUTPATIENT
Start: 2024-08-14

## 2024-08-14 NOTE — TELEPHONE ENCOUNTER
Requested Prescriptions     Pending Prescriptions Disp Refills    traZODone 100 MG Oral Tab 90 tablet 1     Sig: Take 1 tablet (100 mg total) by mouth nightly as needed for Sleep.    sertraline 50 MG Oral Tab 90 tablet 1     Sig: Take 1 tablet (50 mg total) by mouth daily.     LOV 4/17/2024     Patient was asked to follow-up in: 1 year    Appointment scheduled: Visit date not found       Please advise on refill request

## 2024-08-16 RX ORDER — ETONOGESTREL AND ETHINYL ESTRADIOL VAGINAL RING .015; .12 MG/D; MG/D
RING VAGINAL
Refills: 12 | OUTPATIENT
Start: 2024-08-16

## 2024-12-23 NOTE — TELEPHONE ENCOUNTER
1. COVID-19 virus infection (Primary)  -     E-visit (electronic visit); Future; Expected date: 12/23/2024       OK to notify. Thanks, Brigido Reece MD

## 2024-12-23 NOTE — PROGRESS NOTES
Ana Antonio is a 29 year old female submitting an E-Visit questionnaire:  No chief complaint on file.      Subjective:   HPI     Mychart E-Visit [place] 1       Question 12/23/2024 11:49 AM CST - Filed by Patient    Is the patient up to date on their COVID-19 booster shot / vaccines?  Yes    Have you recently traveled? No    Do you have any of the following new or worsening symptoms? Chills     Cough     Diarrhea     Fatigue     Fever     Muscle pain     Severe headache     Sore throat    Please specify the start date of your symptoms. 12/21/2024    Has the patient tested for COVID-19 since the on-set of these symptoms? Yes    If yes, what was the test result?  Positive result    Enter the date that the COVID-19 test was taken: 12/23/2024    Has the patient taken any over the counter medicines or home remedies to help the symptoms?  Yes    If yes, please list all of the medications and home remedies the patient has tried.  Tylenol, motrin    Has the patient been exposed to anyone with Covid within the last week? No / Unsure    Is the person you have come into contact with in your household?       Do you have any chronic illnesses, such as diabetes, heart disease, or lung disease, or any illness that would weaken your body's ability to fight infection? No    If you have a thermometer, what is your current temperature (In Fahrenheit)? (range: 80 - 120) 101.1    Please indicate how you took your temperature reading: Mouth    Please enter your heart rate: (range: 10 - 300)       Is the patient currently breastfeeding? No    Anything else you would like to add?               History/Other:   HISTORY REVIEWED    No Further Nursing Notes to Review           Assessment & Plan:   ASSESSMENT AND PLAN   No orders of the defined types were placed in this encounter.    Encounter Diagnoses   Name Primary?    COVID-19 virus infection Yes    Mild intermittent asthma without complication (HCC)      Meds & Refills for this  Visit:  Requested Prescriptions     Signed Prescriptions Disp Refills    nirmatrelvir-ritonavir (PAXLOVID, 300/100,) 300-100 MG Oral Tablet Therapy Pack 30 tablet 0     Sig: Take two nirmatrelvir tablets (300mg) with one ritonavir tablet (100mg) together twice daily for 5 days.       TIME AND CHARGES   E-Visit Times and Charging  Reviewing Chart & QNR: 3 minutes  Plan: 2 minutes  Note: 3 minutes  My total time spent caring for the patient for E-Visit: 8 minutes - 22324  Charges: [id number]    Hold trazodone while using

## 2025-02-19 NOTE — TELEPHONE ENCOUNTER
Appears stable, continue current regimen Front Office: Patient overdue for follow-up. Please schedule patient for OV before further refills will be given.

## 2025-02-19 NOTE — TELEPHONE ENCOUNTER
Refill request for:    Requested Prescriptions     Pending Prescriptions Disp Refills    SERTRALINE 50 MG Oral Tab [Pharmacy Med Name: SERTRALINE HCL TABS 50MG] 90 tablet 3     Sig: TAKE 1 TABLET DAILY    TRAZODONE 100 MG Oral Tab [Pharmacy Med Name: TRAZODONE HCL TABS 100MG] 90 tablet 3     Sig: TAKE 1 TABLET NIGHTLY AS NEEDED FOR SLEEP        Last Prescribed Quantity Refills   Sertraline  8/14/24   90   1   Trazodone  8/14/24   90   1     LOV 4/17/2024     Patient was asked to follow-up in: 1 year    Appointment scheduled: Visit date not found    Medication not on protocol.       Routed to Shanell Kelly MD, for review

## 2025-04-30 NOTE — TELEPHONE ENCOUNTER
Shanell in basket- short term refills provided, due for physical, please schedule. Will need visit for future refills. Thanks.

## 2025-04-30 NOTE — TELEPHONE ENCOUNTER
SERTRALINE HCL TABS 50MG     Sig: TAKE 1 TABLET DAILY    Disp: 30 tablet    Refills: 11    Start: 4/24/2025    Class: Normal    Non-formulary For: Anxiety    Last ordered: 2 months ago (2/18/2025) by oRbin Reece MD    Last refill: 2/19/2025    Rx #: [id number]    Psychiatric Non-Scheduled (Anti-Anxiety) [id number]/24/2025 09:51 PM   Protocol Details In person appointment or virtual visit in the past 6 mos or appointment in next 3 mos    Depression Screening completed within the past 12 months    Medication is active on med list       Name from pharmacy: TRAZODONE HCL TABS 100MG    Sig: TAKE 1 TABLET NIGHTLY AS NEEDED FOR SLEEP

## 2025-07-25 NOTE — TELEPHONE ENCOUNTER
Please review; protocol failed/No Protocol      Last Office Visit: 04/17/2024  Future Appointments   Date Time Provider Department Center   9/12/2025  2:15 PM Robin Reece MD [facility] [place]

## (undated) NOTE — MR AVS SNAPSHOT
[address] [facility], [address]  [address] [place] [address] [phone]               Thank you for choosing us for your health care visit with Daya Gómez MD.  We are glad to serve you and happy to provide you with this summa o Be role models themselves by making healthy eating and daily physical activity the norm for their family.   o Create a home where healthy choices are available and encouraged  o Make it fun – find ways to engage your children such as:  o playing a game of Immunizations Administered in the Office Today     HEP A     Hpv Virus Vaccine 9 Estrellita Im     MENINGOCOCCAL       Bhakti     Sign up for Med Access, your secure online medical record.   Med Access will allow you to access patient instructions from your recent visi Start activities slowly and build up over time Do what you like   Get your heart pumping – brisk walking, biking, swimming Even 10 minute increments are effective and add up over the week   2 ½ hours per week – spread out over time Use a tacho to keep you

## (undated) NOTE — ED AVS SNAPSHOT
Chad Singh   MRN: [id number]    Department:  [facility] Emergency Department   Date of Visit:  11/6/2017           Disclosure     Insurance plans vary and the physician(s) referred by the ER may not be covered by your plan.  Please contact yo CARE PHYSICIAN AT ONCE OR RETURN IMMEDIATELY TO THE EMERGENCY DEPARTMENT. If you have been prescribed any medication(s), please fill your prescription right away and begin taking the medication(s) as directed.   If you believe that any of the medications

## (undated) NOTE — Clinical Note
ASTHMA ACTION PLAN for Doris Pollack     : 1995     Date: 2017  Provider:  Franky Linares MD  Phone for doctor or clinic: [address] [facility], [address] [place] [address] [address] [place] [address] [place] [phone]